# Patient Record
Sex: FEMALE | Race: WHITE | Employment: OTHER | URBAN - METROPOLITAN AREA
[De-identification: names, ages, dates, MRNs, and addresses within clinical notes are randomized per-mention and may not be internally consistent; named-entity substitution may affect disease eponyms.]

---

## 2017-04-03 ENCOUNTER — OFFICE VISIT (OUTPATIENT)
Dept: DERMATOLOGY CLINIC | Facility: CLINIC | Age: 72
End: 2017-04-03

## 2017-04-03 DIAGNOSIS — D23.9 BENIGN NEOPLASM OF SKIN, UNSPECIFIED LOCATION: ICD-10-CM

## 2017-04-03 DIAGNOSIS — L30.9 DERMATITIS: Primary | ICD-10-CM

## 2017-04-03 DIAGNOSIS — L30.4 INTERTRIGO: ICD-10-CM

## 2017-04-03 DIAGNOSIS — L57.0 AK (ACTINIC KERATOSIS): ICD-10-CM

## 2017-04-03 PROCEDURE — 99213 OFFICE O/P EST LOW 20 MIN: CPT | Performed by: DERMATOLOGY

## 2017-04-03 PROCEDURE — G0463 HOSPITAL OUTPT CLINIC VISIT: HCPCS | Performed by: DERMATOLOGY

## 2017-04-03 RX ORDER — CLOTRIMAZOLE AND BETAMETHASONE DIPROPIONATE 10; .64 MG/G; MG/G
CREAM TOPICAL
Qty: 90 G | Refills: 3 | Status: SHIPPED | OUTPATIENT
Start: 2017-04-03 | End: 2018-01-09

## 2017-04-17 NOTE — PROGRESS NOTES
Torie Frausto is a 80year old female. HPI:     CC:  Patient presents with:  Rash: established pt. presents with a rash to inframmamary region on/off for a \"very long time\". red and \"sore\". tried elocon with no results.  cornstarch worked best.   Angel Cavazos Nausea and vomiting  Demerol Hcl [Meperi*        Past Medical History   Diagnosis Date   • Unspecified essential hypertension    • High blood pressure    • Osteoarthritis          Past Surgical History     N/A     Comment x3    HYSTERECTOMY      C Well-developed well-nourished patient alert oriented in no acute distress.   Exam total-body performed, including scalp, head, neck, face,nails, hair, external eyes, including conjunctival mucosa, eyelids, lips external ears, back, chest,/ breasts, axil

## 2017-11-29 ENCOUNTER — OFFICE VISIT (OUTPATIENT)
Dept: PAIN CLINIC | Facility: HOSPITAL | Age: 72
End: 2017-11-29
Attending: ANESTHESIOLOGY
Payer: MEDICARE

## 2017-11-29 VITALS
WEIGHT: 160 LBS | DIASTOLIC BLOOD PRESSURE: 88 MMHG | BODY MASS INDEX: 28.35 KG/M2 | SYSTOLIC BLOOD PRESSURE: 177 MMHG | HEIGHT: 63 IN | RESPIRATION RATE: 18 BRPM | HEART RATE: 66 BPM

## 2017-11-29 DIAGNOSIS — M17.11 PRIMARY OSTEOARTHRITIS OF RIGHT KNEE: Primary | ICD-10-CM

## 2017-11-29 DIAGNOSIS — M17.12 PRIMARY OSTEOARTHRITIS OF LEFT KNEE: ICD-10-CM

## 2017-11-29 DIAGNOSIS — M25.569 KNEE PAIN, UNSPECIFIED CHRONICITY, UNSPECIFIED LATERALITY: ICD-10-CM

## 2017-11-29 PROCEDURE — 99201 HC OUTPT EVAL AND MGNT NEW PT LEVEL 1: CPT

## 2017-11-29 NOTE — CHRONIC PAIN
Initial Consultation    HISTORY OF PRESENT ILLNESS:  Josafat Singh is a 71year old old female presents to the pain clinic for pre-operative pain evaluation for her upcoming left knee replacement in January.    Pt underwent a previous right knee replacem Primary osteoarthritis of right knee     Hypertension     Arthritis of right knee    Past Medical History:   Diagnosis Date   • High blood pressure    • Osteoarthritis    • Unspecified essential hypertension        SURGICAL HISTORY:  Past Surgical History medial compartment with associated                subchondral sclerosis and mild osteophytic spurring. No                significant arthritis in the patellofemoral joint. No                evidence of fractures or dislocations. SOFT TISSUES: Negative.   Debara Bosworth

## 2017-12-27 RX ORDER — DICLOFENAC SODIUM 75 MG/1
75 TABLET, DELAYED RELEASE ORAL 2 TIMES DAILY PRN
COMMUNITY
End: 2018-01-09

## 2017-12-28 ENCOUNTER — LAB ENCOUNTER (OUTPATIENT)
Dept: LAB | Facility: HOSPITAL | Age: 72
End: 2017-12-28
Attending: FAMILY MEDICINE
Payer: MEDICARE

## 2017-12-28 DIAGNOSIS — Z01.818 PREOP TESTING: ICD-10-CM

## 2017-12-28 DIAGNOSIS — Z01.818 PRE-OP EXAM: ICD-10-CM

## 2017-12-28 DIAGNOSIS — I10 HTN (HYPERTENSION): Primary | ICD-10-CM

## 2017-12-28 PROCEDURE — 86850 RBC ANTIBODY SCREEN: CPT

## 2017-12-28 PROCEDURE — 36415 COLL VENOUS BLD VENIPUNCTURE: CPT

## 2017-12-28 PROCEDURE — 85025 COMPLETE CBC W/AUTO DIFF WBC: CPT

## 2017-12-28 PROCEDURE — 87641 MR-STAPH DNA AMP PROBE: CPT

## 2017-12-28 PROCEDURE — 80053 COMPREHEN METABOLIC PANEL: CPT

## 2017-12-28 PROCEDURE — 93010 ELECTROCARDIOGRAM REPORT: CPT | Performed by: FAMILY MEDICINE

## 2017-12-28 PROCEDURE — 86900 BLOOD TYPING SEROLOGIC ABO: CPT

## 2017-12-28 PROCEDURE — 86901 BLOOD TYPING SEROLOGIC RH(D): CPT

## 2017-12-28 PROCEDURE — 93005 ELECTROCARDIOGRAM TRACING: CPT

## 2018-01-05 ENCOUNTER — SURGERY (OUTPATIENT)
Age: 73
End: 2018-01-05

## 2018-01-05 ENCOUNTER — ANESTHESIA (OUTPATIENT)
Dept: SURGERY | Facility: HOSPITAL | Age: 73
DRG: 470 | End: 2018-01-05
Payer: MEDICARE

## 2018-01-05 ENCOUNTER — HOSPITAL ENCOUNTER (INPATIENT)
Facility: HOSPITAL | Age: 73
LOS: 4 days | Discharge: HOME HEALTH | DRG: 470 | End: 2018-01-09
Attending: ORTHOPAEDIC SURGERY | Admitting: ORTHOPAEDIC SURGERY
Payer: MEDICARE

## 2018-01-05 ENCOUNTER — APPOINTMENT (OUTPATIENT)
Dept: GENERAL RADIOLOGY | Facility: HOSPITAL | Age: 73
DRG: 470 | End: 2018-01-05
Attending: ORTHOPAEDIC SURGERY
Payer: MEDICARE

## 2018-01-05 ENCOUNTER — ANESTHESIA EVENT (OUTPATIENT)
Dept: SURGERY | Facility: HOSPITAL | Age: 73
DRG: 470 | End: 2018-01-05
Payer: MEDICARE

## 2018-01-05 DIAGNOSIS — Z01.818 PREOP TESTING: Primary | ICD-10-CM

## 2018-01-05 DIAGNOSIS — G89.18 ACUTE POST-OPERATIVE PAIN: ICD-10-CM

## 2018-01-05 DIAGNOSIS — Z96.652 S/P TOTAL KNEE ARTHROPLASTY, LEFT: ICD-10-CM

## 2018-01-05 DIAGNOSIS — M17.12 PRIMARY OSTEOARTHRITIS OF LEFT KNEE: ICD-10-CM

## 2018-01-05 PROCEDURE — 99232 SBSQ HOSP IP/OBS MODERATE 35: CPT | Performed by: HOSPITALIST

## 2018-01-05 PROCEDURE — 0SRD0J9 REPLACEMENT OF LEFT KNEE JOINT WITH SYNTHETIC SUBSTITUTE, CEMENTED, OPEN APPROACH: ICD-10-PCS | Performed by: ORTHOPAEDIC SURGERY

## 2018-01-05 PROCEDURE — 73560 X-RAY EXAM OF KNEE 1 OR 2: CPT | Performed by: ORTHOPAEDIC SURGERY

## 2018-01-05 PROCEDURE — 3E0T3BZ INTRODUCTION OF ANESTHETIC AGENT INTO PERIPHERAL NERVES AND PLEXI, PERCUTANEOUS APPROACH: ICD-10-PCS | Performed by: ANESTHESIOLOGY

## 2018-01-05 DEVICE — COMPONENT PTLR 28MM 1 PG WRE: Type: IMPLANTABLE DEVICE | Site: KNEE | Status: FUNCTIONAL

## 2018-01-05 DEVICE — CEMENT BONE ZIM PALICOS R: Type: IMPLANTABLE DEVICE | Site: KNEE | Status: FUNCTIONAL

## 2018-01-05 DEVICE — IMPLANTABLE DEVICE: Type: IMPLANTABLE DEVICE | Site: KNEE | Status: FUNCTIONAL

## 2018-01-05 RX ORDER — METOPROLOL TARTRATE 5 MG/5ML
2.5 INJECTION INTRAVENOUS ONCE
Status: DISCONTINUED | OUTPATIENT
Start: 2018-01-05 | End: 2018-01-05 | Stop reason: HOSPADM

## 2018-01-05 RX ORDER — GLYCOPYRROLATE 0.2 MG/ML
INJECTION, SOLUTION INTRAMUSCULAR; INTRAVENOUS AS NEEDED
Status: DISCONTINUED | OUTPATIENT
Start: 2018-01-05 | End: 2018-01-05 | Stop reason: SURG

## 2018-01-05 RX ORDER — SODIUM CHLORIDE 9 MG/ML
INJECTION, SOLUTION INTRAVENOUS
Status: COMPLETED
Start: 2018-01-05 | End: 2018-01-05

## 2018-01-05 RX ORDER — CEFAZOLIN SODIUM/WATER 2 G/20 ML
SYRINGE (ML) INTRAVENOUS AS NEEDED
Status: DISCONTINUED | OUTPATIENT
Start: 2018-01-05 | End: 2018-01-05 | Stop reason: SURG

## 2018-01-05 RX ORDER — HYDROMORPHONE HYDROCHLORIDE 1 MG/ML
0.6 INJECTION, SOLUTION INTRAMUSCULAR; INTRAVENOUS; SUBCUTANEOUS EVERY 5 MIN PRN
Status: DISCONTINUED | OUTPATIENT
Start: 2018-01-05 | End: 2018-01-05 | Stop reason: HOSPADM

## 2018-01-05 RX ORDER — FAMOTIDINE 20 MG/1
20 TABLET ORAL ONCE
Status: DISCONTINUED | OUTPATIENT
Start: 2018-01-05 | End: 2018-01-05 | Stop reason: HOSPADM

## 2018-01-05 RX ORDER — DOXEPIN HYDROCHLORIDE 50 MG/1
1 CAPSULE ORAL DAILY
Status: DISCONTINUED | OUTPATIENT
Start: 2018-01-05 | End: 2018-01-09

## 2018-01-05 RX ORDER — ONDANSETRON 2 MG/ML
INJECTION INTRAMUSCULAR; INTRAVENOUS AS NEEDED
Status: DISCONTINUED | OUTPATIENT
Start: 2018-01-05 | End: 2018-01-05 | Stop reason: SURG

## 2018-01-05 RX ORDER — LIDOCAINE HYDROCHLORIDE 10 MG/ML
INJECTION, SOLUTION EPIDURAL; INFILTRATION; INTRACAUDAL; PERINEURAL AS NEEDED
Status: DISCONTINUED | OUTPATIENT
Start: 2018-01-05 | End: 2018-01-05 | Stop reason: SURG

## 2018-01-05 RX ORDER — LOSARTAN POTASSIUM 100 MG/1
100 TABLET ORAL NIGHTLY
Status: DISCONTINUED | OUTPATIENT
Start: 2018-01-05 | End: 2018-01-09

## 2018-01-05 RX ORDER — DEXAMETHASONE SODIUM PHOSPHATE 4 MG/ML
VIAL (ML) INJECTION AS NEEDED
Status: DISCONTINUED | OUTPATIENT
Start: 2018-01-05 | End: 2018-01-05 | Stop reason: SURG

## 2018-01-05 RX ORDER — MIDAZOLAM HYDROCHLORIDE 1 MG/ML
INJECTION INTRAMUSCULAR; INTRAVENOUS AS NEEDED
Status: DISCONTINUED | OUTPATIENT
Start: 2018-01-05 | End: 2018-01-05 | Stop reason: SURG

## 2018-01-05 RX ORDER — HYDROCODONE BITARTRATE AND ACETAMINOPHEN 5; 325 MG/1; MG/1
2 TABLET ORAL AS NEEDED
Status: DISCONTINUED | OUTPATIENT
Start: 2018-01-05 | End: 2018-01-05 | Stop reason: HOSPADM

## 2018-01-05 RX ORDER — CEFAZOLIN SODIUM/WATER 2 G/20 ML
2 SYRINGE (ML) INTRAVENOUS ONCE
Status: DISCONTINUED | OUTPATIENT
Start: 2018-01-05 | End: 2018-01-05 | Stop reason: HOSPADM

## 2018-01-05 RX ORDER — SODIUM CHLORIDE, SODIUM LACTATE, POTASSIUM CHLORIDE, CALCIUM CHLORIDE 600; 310; 30; 20 MG/100ML; MG/100ML; MG/100ML; MG/100ML
INJECTION, SOLUTION INTRAVENOUS CONTINUOUS
Status: DISCONTINUED | OUTPATIENT
Start: 2018-01-05 | End: 2018-01-09

## 2018-01-05 RX ORDER — HYDROCHLOROTHIAZIDE 25 MG/1
12.5 TABLET ORAL DAILY
Status: DISCONTINUED | OUTPATIENT
Start: 2018-01-05 | End: 2018-01-09

## 2018-01-05 RX ORDER — SODIUM CHLORIDE 0.9 % (FLUSH) 0.9 %
10 SYRINGE (ML) INJECTION AS NEEDED
Status: DISCONTINUED | OUTPATIENT
Start: 2018-01-05 | End: 2018-01-09

## 2018-01-05 RX ORDER — ONDANSETRON 2 MG/ML
4 INJECTION INTRAMUSCULAR; INTRAVENOUS ONCE AS NEEDED
Status: COMPLETED | OUTPATIENT
Start: 2018-01-05 | End: 2018-01-05

## 2018-01-05 RX ORDER — HYDROMORPHONE HYDROCHLORIDE 1 MG/ML
0.2 INJECTION, SOLUTION INTRAMUSCULAR; INTRAVENOUS; SUBCUTANEOUS EVERY 5 MIN PRN
Status: DISCONTINUED | OUTPATIENT
Start: 2018-01-05 | End: 2018-01-05 | Stop reason: HOSPADM

## 2018-01-05 RX ORDER — HYDROCODONE BITARTRATE AND ACETAMINOPHEN 5; 325 MG/1; MG/1
1 TABLET ORAL AS NEEDED
Status: DISCONTINUED | OUTPATIENT
Start: 2018-01-05 | End: 2018-01-05 | Stop reason: HOSPADM

## 2018-01-05 RX ORDER — SODIUM CHLORIDE, SODIUM LACTATE, POTASSIUM CHLORIDE, CALCIUM CHLORIDE 600; 310; 30; 20 MG/100ML; MG/100ML; MG/100ML; MG/100ML
INJECTION, SOLUTION INTRAVENOUS CONTINUOUS
Status: DISCONTINUED | OUTPATIENT
Start: 2018-01-05 | End: 2018-01-05 | Stop reason: HOSPADM

## 2018-01-05 RX ORDER — HALOPERIDOL 5 MG/ML
0.25 INJECTION INTRAMUSCULAR ONCE AS NEEDED
Status: DISCONTINUED | OUTPATIENT
Start: 2018-01-05 | End: 2018-01-05 | Stop reason: HOSPADM

## 2018-01-05 RX ORDER — NALOXONE HYDROCHLORIDE 0.4 MG/ML
80 INJECTION, SOLUTION INTRAMUSCULAR; INTRAVENOUS; SUBCUTANEOUS AS NEEDED
Status: DISCONTINUED | OUTPATIENT
Start: 2018-01-05 | End: 2018-01-05 | Stop reason: HOSPADM

## 2018-01-05 RX ORDER — METOCLOPRAMIDE 10 MG/1
10 TABLET ORAL ONCE
Status: DISCONTINUED | OUTPATIENT
Start: 2018-01-05 | End: 2018-01-05 | Stop reason: HOSPADM

## 2018-01-05 RX ORDER — MORPHINE SULFATE 4 MG/ML
4 INJECTION, SOLUTION INTRAMUSCULAR; INTRAVENOUS EVERY 10 MIN PRN
Status: DISCONTINUED | OUTPATIENT
Start: 2018-01-05 | End: 2018-01-05 | Stop reason: HOSPADM

## 2018-01-05 RX ORDER — WARFARIN SODIUM 5 MG/1
5 TABLET ORAL ONCE
Status: COMPLETED | OUTPATIENT
Start: 2018-01-05 | End: 2018-01-05

## 2018-01-05 RX ORDER — HYDROMORPHONE HYDROCHLORIDE 1 MG/ML
INJECTION, SOLUTION INTRAMUSCULAR; INTRAVENOUS; SUBCUTANEOUS AS NEEDED
Status: DISCONTINUED | OUTPATIENT
Start: 2018-01-05 | End: 2018-01-05 | Stop reason: SURG

## 2018-01-05 RX ORDER — DOCUSATE SODIUM 100 MG/1
100 CAPSULE, LIQUID FILLED ORAL 2 TIMES DAILY
Status: DISCONTINUED | OUTPATIENT
Start: 2018-01-05 | End: 2018-01-09

## 2018-01-05 RX ORDER — METOPROLOL TARTRATE 5 MG/5ML
2 INJECTION INTRAVENOUS ONCE
Status: COMPLETED | OUTPATIENT
Start: 2018-01-05 | End: 2018-01-05

## 2018-01-05 RX ORDER — ENOXAPARIN SODIUM 100 MG/ML
30 INJECTION SUBCUTANEOUS 2 TIMES DAILY
Status: DISCONTINUED | OUTPATIENT
Start: 2018-01-06 | End: 2018-01-09

## 2018-01-05 RX ORDER — CEFAZOLIN SODIUM/WATER 2 G/20 ML
2 SYRINGE (ML) INTRAVENOUS EVERY 8 HOURS
Status: COMPLETED | OUTPATIENT
Start: 2018-01-05 | End: 2018-01-06

## 2018-01-05 RX ORDER — HYDROMORPHONE HYDROCHLORIDE 1 MG/ML
0.4 INJECTION, SOLUTION INTRAMUSCULAR; INTRAVENOUS; SUBCUTANEOUS EVERY 5 MIN PRN
Status: DISCONTINUED | OUTPATIENT
Start: 2018-01-05 | End: 2018-01-05 | Stop reason: HOSPADM

## 2018-01-05 RX ORDER — POLYETHYLENE GLYCOL 3350 17 G/17G
17 POWDER, FOR SOLUTION ORAL DAILY PRN
Status: DISCONTINUED | OUTPATIENT
Start: 2018-01-05 | End: 2018-01-09

## 2018-01-05 RX ORDER — MORPHINE SULFATE 10 MG/ML
6 INJECTION, SOLUTION INTRAMUSCULAR; INTRAVENOUS EVERY 10 MIN PRN
Status: DISCONTINUED | OUTPATIENT
Start: 2018-01-05 | End: 2018-01-05 | Stop reason: HOSPADM

## 2018-01-05 RX ORDER — MORPHINE SULFATE 2 MG/ML
2 INJECTION, SOLUTION INTRAMUSCULAR; INTRAVENOUS EVERY 10 MIN PRN
Status: DISCONTINUED | OUTPATIENT
Start: 2018-01-05 | End: 2018-01-05 | Stop reason: HOSPADM

## 2018-01-05 RX ORDER — SCOLOPAMINE TRANSDERMAL SYSTEM 1 MG/1
PATCH, EXTENDED RELEASE TRANSDERMAL AS NEEDED
Status: DISCONTINUED | OUTPATIENT
Start: 2018-01-05 | End: 2018-01-05 | Stop reason: SURG

## 2018-01-05 RX ORDER — SODIUM PHOSPHATE, DIBASIC AND SODIUM PHOSPHATE, MONOBASIC 7; 19 G/133ML; G/133ML
1 ENEMA RECTAL ONCE AS NEEDED
Status: DISCONTINUED | OUTPATIENT
Start: 2018-01-05 | End: 2018-01-09

## 2018-01-05 RX ORDER — CARVEDILOL 12.5 MG/1
12.5 TABLET ORAL 2 TIMES DAILY WITH MEALS
Status: DISCONTINUED | OUTPATIENT
Start: 2018-01-05 | End: 2018-01-09

## 2018-01-05 RX ORDER — BISACODYL 10 MG
10 SUPPOSITORY, RECTAL RECTAL
Status: DISCONTINUED | OUTPATIENT
Start: 2018-01-05 | End: 2018-01-09

## 2018-01-05 RX ADMIN — HYDROMORPHONE HYDROCHLORIDE 0.25 MG: 1 INJECTION, SOLUTION INTRAMUSCULAR; INTRAVENOUS; SUBCUTANEOUS at 09:39:00

## 2018-01-05 RX ADMIN — MIDAZOLAM HYDROCHLORIDE 2 MG: 1 INJECTION INTRAMUSCULAR; INTRAVENOUS at 09:13:00

## 2018-01-05 RX ADMIN — CEFAZOLIN SODIUM/WATER 2 G: 2 G/20 ML SYRINGE (ML) INTRAVENOUS at 09:20:00

## 2018-01-05 RX ADMIN — SODIUM CHLORIDE, SODIUM LACTATE, POTASSIUM CHLORIDE, CALCIUM CHLORIDE: 600; 310; 30; 20 INJECTION, SOLUTION INTRAVENOUS at 11:05:00

## 2018-01-05 RX ADMIN — GLYCOPYRROLATE 0.2 MG: 0.2 INJECTION, SOLUTION INTRAMUSCULAR; INTRAVENOUS at 10:52:00

## 2018-01-05 RX ADMIN — HYDROMORPHONE HYDROCHLORIDE 0.25 MG: 1 INJECTION, SOLUTION INTRAMUSCULAR; INTRAVENOUS; SUBCUTANEOUS at 09:40:00

## 2018-01-05 RX ADMIN — LIDOCAINE HYDROCHLORIDE 50 MG: 10 INJECTION, SOLUTION EPIDURAL; INFILTRATION; INTRACAUDAL; PERINEURAL at 09:13:00

## 2018-01-05 RX ADMIN — HYDROMORPHONE HYDROCHLORIDE 0.25 MG: 1 INJECTION, SOLUTION INTRAMUSCULAR; INTRAVENOUS; SUBCUTANEOUS at 09:44:00

## 2018-01-05 RX ADMIN — SODIUM CHLORIDE, SODIUM LACTATE, POTASSIUM CHLORIDE, CALCIUM CHLORIDE: 600; 310; 30; 20 INJECTION, SOLUTION INTRAVENOUS at 09:35:00

## 2018-01-05 RX ADMIN — SODIUM CHLORIDE, SODIUM LACTATE, POTASSIUM CHLORIDE, CALCIUM CHLORIDE: 600; 310; 30; 20 INJECTION, SOLUTION INTRAVENOUS at 10:25:00

## 2018-01-05 RX ADMIN — DEXAMETHASONE SODIUM PHOSPHATE 4 MG: 4 MG/ML VIAL (ML) INJECTION at 09:13:00

## 2018-01-05 RX ADMIN — HYDROMORPHONE HYDROCHLORIDE 0.25 MG: 1 INJECTION, SOLUTION INTRAMUSCULAR; INTRAVENOUS; SUBCUTANEOUS at 09:45:00

## 2018-01-05 RX ADMIN — SODIUM CHLORIDE, SODIUM LACTATE, POTASSIUM CHLORIDE, CALCIUM CHLORIDE: 600; 310; 30; 20 INJECTION, SOLUTION INTRAVENOUS at 09:13:00

## 2018-01-05 RX ADMIN — ONDANSETRON 4 MG: 2 INJECTION INTRAMUSCULAR; INTRAVENOUS at 11:00:00

## 2018-01-05 RX ADMIN — SCOLOPAMINE TRANSDERMAL SYSTEM 1 PATCH: 1 PATCH, EXTENDED RELEASE TRANSDERMAL at 08:45:00

## 2018-01-05 RX ADMIN — MIDAZOLAM HYDROCHLORIDE 1 MG: 1 INJECTION INTRAMUSCULAR; INTRAVENOUS at 08:42:00

## 2018-01-05 RX ADMIN — SODIUM CHLORIDE, SODIUM LACTATE, POTASSIUM CHLORIDE, CALCIUM CHLORIDE: 600; 310; 30; 20 INJECTION, SOLUTION INTRAVENOUS at 10:50:00

## 2018-01-05 NOTE — PROGRESS NOTES
[facility]    Progress Note    Rosio Bell Patient Status:  Inpatient    1945 MRN [id number]   Location [facility] 4W/SW/SE Attending Tootie Archibald MD   Hosp Day # 0 PCP KELIN GIPSON     Subjective:     Con Results:     Lab Results  Component Value Date   WBC 6.2 12/28/2017   HGB 13.7 12/28/2017   HCT 41.3 12/28/2017    12/28/2017   CREATSERUM 0.77 12/28/2017   BUN 11 12/28/2017    12/28/2017   K 3.4 12/28/2017    12/28/2017   CO2 2

## 2018-01-05 NOTE — INTERVAL H&P NOTE
Pre-op Diagnosis: Primary osteoarthritis left knee     The above referenced H&P was reviewed by Norberto Russo MD on 1/5/2018, the patient was examined and no significant changes have occurred in the patient's condition since the H&P was performed.   I discu

## 2018-01-05 NOTE — ANESTHESIA POSTPROCEDURE EVALUATION
Patient:  Marcial Gu    Procedure Summary     Date:  01/05/18 Room / Location:  [facility] OR [address] [place] [address] OR    Anesthesia Start:  0848 Anesthesia Stop:      Procedure:  KNEE TOTAL REPLACEMENT (Left ) Diagnosis:  (Primary osteoarthritis left knee )

## 2018-01-05 NOTE — CM/SW NOTE
ROSCOE met with the pt. At bedside. The pt. Lives with her  in a 2 story home with the bedrooms on the 2nd level. The pt. Reports being independent prior to admission with adls, ambulation and driving. The pt's children live in the area. The pt.  Is

## 2018-01-05 NOTE — ANESTHESIA PREPROCEDURE EVALUATION
Anesthesia PreOp Note    HPI:     Janet Tena is a 71year old female who presents for preoperative consultation requested by: Fouzia Verduzco MD    Date of Surgery: 1/5/2018    Procedure(s):  KNEE TOTAL REPLACEMENT  Indication: Primary osteoarthritis (LOPRESSOR) tab 25 mg 25 mg Oral Once PRN Dirk Resides, MD    famoTIDine (PEPCID) tab 20 mg 20 mg Oral Once Dirk Resides, MD    Metoclopramide HCl (REGLAN) tab 10 mg 10 mg Oral Once Dirk Resides, MD    ceFAZolin sodium (ANCEF/KEFZOL) 2 GM/20 is 79.7 kg (175 lb 9.6 oz). Her oral temperature is 97.8 °F (36.6 °C). Her blood pressure is 187/71 (abnormal) and her pulse is 62. Her respiration is 18 and oxygen saturation is 96%.     12/27/17  1222 01/05/18  0735 01/05/18  0808   BP:  (!) 168/86 (!) 18

## 2018-01-05 NOTE — BRIEF OP NOTE
[address] UNIT  Brief Op Note     Jaime Becerril Location: OR   CSN [id number] MRN [id number]   Admission Date 1/5/2018 Operation Date 1/5/2018   Attending Physician Amber Carter MD Operating Physician Sterling Cochran MD

## 2018-01-05 NOTE — ANESTHESIA PROCEDURE NOTES
Peripheral Block    Anesthesiologist:  Caity BO  Performed by:   Anesthesiologist  Patient Location:  PACU  Start Time:  1/5/2018 8:41 AM  End Time:  1/5/2018 8:45 AM  Site Identification: static ultrasound guided    Reason for Block: at surgeon's

## 2018-01-06 LAB
BASOPHILS # BLD: 0 K/UL (ref 0–0.2)
BASOPHILS NFR BLD: 0 %
EOSINOPHIL # BLD: 0 K/UL (ref 0–0.7)
EOSINOPHIL NFR BLD: 0 %
ERYTHROCYTE [DISTWIDTH] IN BLOOD BY AUTOMATED COUNT: 14 % (ref 11–15)
HCT VFR BLD AUTO: 38.4 % (ref 35–48)
HGB BLD-MCNC: 12.5 G/DL (ref 12–16)
INR BLD: 1.1 (ref 0.9–1.2)
LYMPHOCYTES # BLD: 1.7 K/UL (ref 1–4)
LYMPHOCYTES NFR BLD: 15 %
MCH RBC QN AUTO: 31 PG (ref 27–32)
MCHC RBC AUTO-ENTMCNC: 32.4 G/DL (ref 32–37)
MCV RBC AUTO: 95.5 FL (ref 80–100)
MONOCYTES # BLD: 0.9 K/UL (ref 0–1)
MONOCYTES NFR BLD: 8 %
NEUTROPHILS # BLD AUTO: 8.7 K/UL (ref 1.8–7.7)
NEUTROPHILS NFR BLD: 77 %
PLATELET # BLD AUTO: 184 K/UL (ref 140–400)
PMV BLD AUTO: 9.6 FL (ref 7.4–10.3)
PROTHROMBIN TIME: 13.9 SECONDS (ref 11.8–14.5)
RBC # BLD AUTO: 4.02 M/UL (ref 3.7–5.4)
WBC # BLD AUTO: 11.2 K/UL (ref 4–11)

## 2018-01-06 PROCEDURE — 99232 SBSQ HOSP IP/OBS MODERATE 35: CPT | Performed by: HOSPITALIST

## 2018-01-06 RX ORDER — ACETAMINOPHEN 325 MG/1
650 TABLET ORAL EVERY 6 HOURS PRN
Status: DISCONTINUED | OUTPATIENT
Start: 2018-01-06 | End: 2018-01-09

## 2018-01-06 RX ORDER — HYDROMORPHONE HYDROCHLORIDE 1 MG/ML
0.2 INJECTION, SOLUTION INTRAMUSCULAR; INTRAVENOUS; SUBCUTANEOUS EVERY 4 HOURS PRN
Status: DISCONTINUED | OUTPATIENT
Start: 2018-01-06 | End: 2018-01-09

## 2018-01-06 RX ORDER — OXYCODONE AND ACETAMINOPHEN 10; 325 MG/1; MG/1
1 TABLET ORAL EVERY 4 HOURS PRN
Status: DISCONTINUED | OUTPATIENT
Start: 2018-01-06 | End: 2018-01-09

## 2018-01-06 RX ORDER — ONDANSETRON 2 MG/ML
4 INJECTION INTRAMUSCULAR; INTRAVENOUS EVERY 4 HOURS PRN
Status: DISCONTINUED | OUTPATIENT
Start: 2018-01-06 | End: 2018-01-09

## 2018-01-06 RX ORDER — WARFARIN SODIUM 5 MG/1
5 TABLET ORAL NIGHTLY
Status: DISCONTINUED | OUTPATIENT
Start: 2018-01-06 | End: 2018-01-09

## 2018-01-06 RX ORDER — TRAMADOL HYDROCHLORIDE 50 MG/1
50 TABLET ORAL EVERY 6 HOURS PRN
Status: DISCONTINUED | OUTPATIENT
Start: 2018-01-06 | End: 2018-01-09

## 2018-01-06 RX ORDER — GABAPENTIN 300 MG/1
300 CAPSULE ORAL 3 TIMES DAILY
Status: DISCONTINUED | OUTPATIENT
Start: 2018-01-06 | End: 2018-01-08

## 2018-01-06 RX ORDER — ACETAMINOPHEN 650 MG/1
650 SUPPOSITORY RECTAL EVERY 6 HOURS PRN
Status: DISCONTINUED | OUTPATIENT
Start: 2018-01-06 | End: 2018-01-06

## 2018-01-06 NOTE — OCCUPATIONAL THERAPY NOTE
OCCUPATIONAL THERAPY EVALUATION - INPATIENT      Room Number: 407/407-A  Evaluation Date: 1/6/2018  Type of Evaluation: Initial  Presenting Problem: L TKA    Physician Order: IP Consult to Occupational Therapy  Reason for Therapy: ADL/IADL Dysfunction and History  Past Surgical History:  No date:  N/A      Comment: x3  No date: CHOLECYSTECTOMY  No date: HYSTERECTOMY  No date: TONSILLECTOMY  10/19/16: TOTAL KNEE REPLACEMENT      Comment: rt total knee- dr. Sidney Soulier  2018: TOTAL KNEE REPLACEMENT CK    FUNCTIONAL TRANSFER ASSESSMENT  Supine to Sit : Maximum assistance  Sit to Stand: Not tested  Toilet Transfer: NT  Shower Transfer: NT  Chair Transfer: NT    Bedroom Mobility: NT    BALANCE ASSESSMENT  Static Sitting: Unable to come to full sitting a

## 2018-01-06 NOTE — PROGRESS NOTES
[facility]  Hospitalist Progress  Note     Basia Fast Patient Status:  Inpatient    1945  71year old [facility] [id number]   Location 407/407-A Attending Laurita Stafford MD   Hosp Day # 1 PCP KELIN Maradiaga     ASSESSMENT/PLAN Oral BID with meals   • hydrochlorothiazide  12.5 mg Oral Daily   • omeprazole  20 mg Oral Daily   • losartan  100 mg Oral Nightly   • enoxaparin  30 mg Subcutaneous BID   • docusate sodium  100 mg Oral BID     ondansetron HCl, TraMADol HCl, oxyCODONE-acet

## 2018-01-06 NOTE — CHRONIC PAIN
[facility]  Report of Consultation    Abbey Suarez Patient Status:  Inpatient    1945 MRN [id number]   Location [facility] 4W/SW/SE Attending Rudy Beaulieu MD   Hosp Day # 1 PCP KELIN Schrader     Date of Admissio  MG per tab 1 tablet, 1 tablet, Oral, Q4H PRN  •  acetaminophen (TYLENOL) tab 650 mg, 650 mg, Oral, Q6H PRN  •  HYDROmorphone HCl PF (DILAUDID) 1 MG/ML injection 0.2 mg, 0.2 mg, Intravenous, Q4H PRN  •  lactated ringers infusion, , Intravenous, Patyt Fritz (1.575 m), weight 175 lb 9.6 oz (79.7 kg), SpO2 93 %.    Patient sleepy from curnt PCA still in bed   NAD on O2 nc   A and O x 3  CV:   RRR,   Pulm:   CTA bilat  Abd:   +bs, soft, NT,       Laboratory Data:    Lab Results  Component Value Date   WBC 11.2 01

## 2018-01-06 NOTE — PHYSICAL THERAPY NOTE
PHYSICAL THERAPY KNEE EVALUATION - INPATIENT     Room Number: 407/407-A  Evaluation Date: 1/6/2018  Type of Evaluation: Initial  Physician Order: PT Eval and Treat    Presenting Problem: L TKA  Reason for Therapy: Mobility Dysfunction and Discharge Plannin training;Neuromuscular re-educate;Range of motion;Strengthening;Stair training;Transfer training;Balance training  Rehab Potential : Good  Frequency (Obs): BID       PHYSICAL THERAPY MEDICAL/SOCIAL HISTORY     History related to current admission: elective into full sitting)  Dynamic Sitting: Not tested  Static Standing: Not tested  Dynamic Standing: Not tested                                                                       ADDITIONAL TESTS                                 ACTIVITY TOLERANCE  Fair    AM modified independent    Goal #3   Current Status 5 ft with mod x1    Goal #4 Patient will negotiate 14  stairs/one curb w/ assistive device and supervision   Goal #4   Current Status    Goal #5  AROM 0 degrees extension to 95 degrees flexion     Goal #5

## 2018-01-06 NOTE — PROGRESS NOTES
ORTHO SURG: PO#1  Tmax = 98.7. AM LABS: INR = 1.1, WBC = 11,200, H/H = 12.5 / 38.4, PLATELETS = 204,092. HEMOVAC DRAINAGE OVER LAST TWO 8 HOUR SHIFTS : 25 ML / 75 ML, DRAINAGE FROM 0700 - 1400 = 90ML. PAIN CONTROL WITH PERCOCET, DILAUDID PCA WAS D/C'D.

## 2018-01-06 NOTE — PLAN OF CARE
DISCHARGE PLANNING    • Discharge to home or other facility with appropriate resources Progressing    Discharge plan is home with [facility] when stable    GENITOURINARY - ADULT    • Absence of urinary retention Progressing    Gamez remains in place until POD# remains c/d/i. Hemovac in place . No other skin issues noted. Assisted pt with repositioning.

## 2018-01-07 LAB
BASOPHILS # BLD: 0 K/UL (ref 0–0.2)
BASOPHILS NFR BLD: 1 %
EOSINOPHIL # BLD: 0.3 K/UL (ref 0–0.7)
EOSINOPHIL NFR BLD: 3 %
ERYTHROCYTE [DISTWIDTH] IN BLOOD BY AUTOMATED COUNT: 13.8 % (ref 11–15)
HCT VFR BLD AUTO: 35.1 % (ref 35–48)
HGB BLD-MCNC: 11.6 G/DL (ref 12–16)
INR BLD: 1.4 (ref 0.9–1.2)
LYMPHOCYTES # BLD: 2.2 K/UL (ref 1–4)
LYMPHOCYTES NFR BLD: 23 %
MCH RBC QN AUTO: 31.3 PG (ref 27–32)
MCHC RBC AUTO-ENTMCNC: 32.9 G/DL (ref 32–37)
MCV RBC AUTO: 95 FL (ref 80–100)
MONOCYTES # BLD: 0.9 K/UL (ref 0–1)
MONOCYTES NFR BLD: 10 %
NEUTROPHILS # BLD AUTO: 6.1 K/UL (ref 1.8–7.7)
NEUTROPHILS NFR BLD: 64 %
PLATELET # BLD AUTO: 149 K/UL (ref 140–400)
PMV BLD AUTO: 9.1 FL (ref 7.4–10.3)
PROTHROMBIN TIME: 16.7 SECONDS (ref 11.8–14.5)
RBC # BLD AUTO: 3.7 M/UL (ref 3.7–5.4)
WBC # BLD AUTO: 9.6 K/UL (ref 4–11)

## 2018-01-07 PROCEDURE — 99232 SBSQ HOSP IP/OBS MODERATE 35: CPT | Performed by: HOSPITALIST

## 2018-01-07 NOTE — PROGRESS NOTES
ORTHO SURG: PO#2. Tmax = 99.2. AM LABS: INR = 1.4, WBC = 9,600, H/H = 11.6 / 35.1, PLATELETS = 902,270. PAIN IS CONTROLLED. PATIENT DENIES ANY NAUSEA. HEMOVAC OUTPUT OVER THE LAST TWO 8 HOUR SHIFTS: 55 ML / 30 ML.  PE: ALERT AT BEDREST, NAD, WOUND DRAIN

## 2018-01-07 NOTE — CHRONIC PAIN
[facility]  Anesthesiology Pain Management Progress Note      Patient name:  Vivi Cooper 71year old female  : 1945  MRN: [id number]    Diagnosis: Preop testing  (primary encounter diagnosis)  Acute post-operative pain    Reason

## 2018-01-07 NOTE — PHYSICAL THERAPY NOTE
PHYSICAL THERAPY KNEE TREATMENT NOTE - INPATIENT     Room Number: 407/407-A             Presenting Problem: L TKA    Problem List  Principal Problem:    Primary osteoarthritis of left knee  Active Problems:    Essential hypertension      ASSESSMENT   AM S Weight Bearing    PAIN ASSESSMENT   Ratin  Location: L knee  Management Techniques: Activity promotion; Body mechanics; Relaxation;Repositioning    BALANCE  Static Sitting: Good  Dynamic Sitting: Fair +  Static Standing: Fair -  Dynamic Standing: Poor + goal is: \"go home\"   Goal #1 Patient is able to demonstrate supine - sit EOB @ level: modified independent     Goal #1   Current Status Min   Goal #2 Patient is able to demonstrate transfers Sit to/from Stand at assistance level: modified independent

## 2018-01-08 LAB
BASOPHILS # BLD: 0 K/UL (ref 0–0.2)
BASOPHILS NFR BLD: 0 %
EOSINOPHIL # BLD: 0.3 K/UL (ref 0–0.7)
EOSINOPHIL NFR BLD: 4 %
ERYTHROCYTE [DISTWIDTH] IN BLOOD BY AUTOMATED COUNT: 13.7 % (ref 11–15)
HCT VFR BLD AUTO: 33.4 % (ref 35–48)
HGB BLD-MCNC: 11.3 G/DL (ref 12–16)
INR BLD: 1.7 (ref 0.9–1.2)
LYMPHOCYTES # BLD: 2.6 K/UL (ref 1–4)
LYMPHOCYTES NFR BLD: 32 %
MCH RBC QN AUTO: 31.6 PG (ref 27–32)
MCHC RBC AUTO-ENTMCNC: 33.8 G/DL (ref 32–37)
MCV RBC AUTO: 93.5 FL (ref 80–100)
MONOCYTES # BLD: 0.9 K/UL (ref 0–1)
MONOCYTES NFR BLD: 11 %
NEUTROPHILS # BLD AUTO: 4.4 K/UL (ref 1.8–7.7)
NEUTROPHILS NFR BLD: 53 %
PLATELET # BLD AUTO: 147 K/UL (ref 140–400)
PMV BLD AUTO: 8.7 FL (ref 7.4–10.3)
PROTHROMBIN TIME: 19.3 SECONDS (ref 11.8–14.5)
RBC # BLD AUTO: 3.58 M/UL (ref 3.7–5.4)
WBC # BLD AUTO: 8.3 K/UL (ref 4–11)

## 2018-01-08 PROCEDURE — 99232 SBSQ HOSP IP/OBS MODERATE 35: CPT | Performed by: HOSPITALIST

## 2018-01-08 RX ORDER — ZOLPIDEM TARTRATE 5 MG/1
5 TABLET ORAL NIGHTLY PRN
Qty: 10 TABLET | Refills: 0 | Status: SHIPPED | OUTPATIENT
Start: 2018-01-08

## 2018-01-08 RX ORDER — GABAPENTIN 300 MG/1
600 CAPSULE ORAL 3 TIMES DAILY
Status: DISCONTINUED | OUTPATIENT
Start: 2018-01-08 | End: 2018-01-09

## 2018-01-08 NOTE — CM/SW NOTE
CTL rounds: went to pt's room to enroll pt in [employer] program and give IM - pt is sleeping soundly at this time. CTL will follow up this afternoon. Discharge plan: anticipated that pt will be discharged to home with [facility] home health care.

## 2018-01-08 NOTE — PHYSICAL THERAPY NOTE
PHYSICAL THERAPY KNEE TREATMENT NOTE - INPATIENT     Room Number: 407/407-A             Presenting Problem: L TKA    Problem List  Principal Problem:    Primary osteoarthritis of left knee  Active Problems:    Essential hypertension      ASSESSMENT   Pt i from a chair with arms (e.g., wheelchair, bedside commode, etc.): A Little   -   Moving from lying on back to sitting on the side of the bed?: A Little   How much help from another person does the patient currently need. ..   -   Moving to and from a bed to will negotiate 14  stairs/one curb w/ assistive device and supervision   Goal #4   Current Status NT   Goal #5  AROM 0 degrees extension to 95 degrees flexion     Goal #5   Current Status IN PROGRESS   Goal #6 Patient independently performs home exercise p

## 2018-01-08 NOTE — PROGRESS NOTES
[facility]  Hospitalist Progress  Note     Ziyad Fuller Patient Status:  Inpatient    1945  71year old [facility] [id number]   Location 407/407-A Attending George Tinajero MD   Hosp Day # 3 PCP KELIN Rob     ASSESSMENT/PLAN 1.4*  1.7*       • gabapentin  300 mg Oral TID   • Warfarin Sodium  5 mg Oral Nightly   • multivitamin  1 tablet Oral Daily   • carvedilol  12.5 mg Oral BID with meals   • hydrochlorothiazide  12.5 mg Oral Daily   • omeprazole  20 mg Oral Daily   • ivone

## 2018-01-08 NOTE — CM/SW NOTE
CTL rounds:  Met with pt at bedside. Pt plans to go home with [facility] health care. States that she has contacted [facility] to request the RN and PT that she is has previously worked with. Pt is BPCI eligible under .   [employer] p

## 2018-01-08 NOTE — PROGRESS NOTES
ORTHO SURG: PO#3  Tmax = 99.2. AM LABS: INR = 1.7, WBC = 8,300, H/H = 11.3 / 33.4, PLATELETS = 030,293. NO POST-OP BM. PAIN MGT. PER ANESTHESIA PAIN SERVICE, CURRENTLY INCLUDES PERCOCET 10/325 q 4 HRS.   PT. D/W DR. Lucy Jefferson REGARDING POST DISCHARGE PAIN MGT.

## 2018-01-08 NOTE — PLAN OF CARE
Impaired Functional Mobility    • Achieve highest/safest level of mobility/gait Progressing    Pt tolerated CPM at 44 degrees flexion and 0 degrees extension. Ambulates with 1 assist and a walker. PT Bid as ordered.  Left knee mmobilizer dc'd this am at 9am

## 2018-01-09 VITALS
SYSTOLIC BLOOD PRESSURE: 148 MMHG | DIASTOLIC BLOOD PRESSURE: 68 MMHG | TEMPERATURE: 99 F | HEART RATE: 68 BPM | OXYGEN SATURATION: 93 % | BODY MASS INDEX: 32.32 KG/M2 | WEIGHT: 175.63 LBS | HEIGHT: 62 IN | RESPIRATION RATE: 18 BRPM

## 2018-01-09 LAB
BASOPHILS # BLD: 0 K/UL (ref 0–0.2)
BASOPHILS NFR BLD: 1 %
EOSINOPHIL # BLD: 0.4 K/UL (ref 0–0.7)
EOSINOPHIL NFR BLD: 6 %
ERYTHROCYTE [DISTWIDTH] IN BLOOD BY AUTOMATED COUNT: 13.6 % (ref 11–15)
HCT VFR BLD AUTO: 33.2 % (ref 35–48)
HGB BLD-MCNC: 11.2 G/DL (ref 12–16)
INR BLD: 1.9 (ref 0.9–1.2)
LYMPHOCYTES # BLD: 2.5 K/UL (ref 1–4)
LYMPHOCYTES NFR BLD: 36 %
MCH RBC QN AUTO: 31.7 PG (ref 27–32)
MCHC RBC AUTO-ENTMCNC: 33.7 G/DL (ref 32–37)
MCV RBC AUTO: 94 FL (ref 80–100)
MONOCYTES # BLD: 0.8 K/UL (ref 0–1)
MONOCYTES NFR BLD: 11 %
NEUTROPHILS # BLD AUTO: 3.2 K/UL (ref 1.8–7.7)
NEUTROPHILS NFR BLD: 47 %
PLATELET # BLD AUTO: 159 K/UL (ref 140–400)
PMV BLD AUTO: 8.7 FL (ref 7.4–10.3)
PROTHROMBIN TIME: 21.7 SECONDS (ref 11.8–14.5)
RBC # BLD AUTO: 3.53 M/UL (ref 3.7–5.4)
WBC # BLD AUTO: 7 K/UL (ref 4–11)

## 2018-01-09 PROCEDURE — 99239 HOSP IP/OBS DSCHRG MGMT >30: CPT | Performed by: HOSPITALIST

## 2018-01-09 RX ORDER — OXYCODONE AND ACETAMINOPHEN 10; 325 MG/1; MG/1
1 TABLET ORAL EVERY 4 HOURS PRN
Qty: 30 TABLET | Refills: 0 | Status: SHIPPED | OUTPATIENT
Start: 2018-01-09 | End: 2018-01-18

## 2018-01-09 RX ORDER — GABAPENTIN 300 MG/1
600 CAPSULE ORAL 3 TIMES DAILY
Qty: 30 CAPSULE | Refills: 0 | Status: SHIPPED | OUTPATIENT
Start: 2018-01-09 | End: 2018-01-18

## 2018-01-09 RX ORDER — WARFARIN SODIUM 5 MG/1
5 TABLET ORAL NIGHTLY
Qty: 30 TABLET | Refills: 0 | Status: SHIPPED | OUTPATIENT
Start: 2018-01-09 | End: 2018-01-09

## 2018-01-09 RX ORDER — WARFARIN SODIUM 2 MG/1
TABLET ORAL
Qty: 60 TABLET | Refills: 0 | Status: SHIPPED | OUTPATIENT
Start: 2018-01-09

## 2018-01-09 NOTE — PROGRESS NOTES
ORTHO SURG: Tmax = 98.8. AM LABS: INR = 1.9, WBC = 7,000, H/H =  11.2 / 33.2, PLATELETS = 153,980. PAIN MGT. WITH PERCOCET 10/325  AND GABAPENTIN - RX'D PER PAIN MANAGEMENT. PE: ALERT, NAD, LEFT KNEE WOUND IS CLEAN AND DRY WITHOUT ERYTHEMA OR INDURATION.

## 2018-01-09 NOTE — PHYSICAL THERAPY NOTE
PHYSICAL THERAPY KNEE TREATMENT NOTE - INPATIENT     Room Number: 407/407-A             Presenting Problem: L TKA    Problem List  Principal Problem:    Primary osteoarthritis of left knee  Active Problems:    Essential hypertension      ASSESSMENT    Pt from a bed to a chair (including a wheelchair)?: A Little   -   Need to walk in hospital room?: A Little   -   Climbing 3-5 steps with a railing?: A Little    AM-PAC Score:  Raw Score: 18   PT Approx Degree of Impairment Score: 46.58%   Standardized Score 95 degrees flexion     Goal #5   Current Status -4/80 AAROM    Goal #6 Patient independently performs home exercise program for ROM/strengthening per the instructions provided in preparation for discharge.    Goal #6  Current Status In progress

## 2018-01-09 NOTE — PLAN OF CARE
GENITOURINARY - ADULT    • Absence of urinary retention Completed          GENITOURINARY - ADULT    • Absence of urinary retention Completed          DISCHARGE PLANNING    • Discharge to home or other facility with appropriate resources Progressing

## 2018-01-09 NOTE — CM/SW NOTE
MD orders received regarding HHC and CPM for home. SW notified [facility] of discharge and sent MD orders. CPM has been ordered through [facility] . Both agencies are aware of the pt. Discharging home today 1/9.     [facility] [address] [place] [address] [phone]

## 2018-01-09 NOTE — PLAN OF CARE
DISCHARGE PLANNING    • Discharge to home or other facility with appropriate resources Completed        HEMATOLOGIC - ADULT    • Maintains hematologic stability Completed    • Free from bleeding injury Completed        Impaired Functional Mobility    • Ach

## 2018-01-09 NOTE — DISCHARGE SUMMARY
[facility]    Discharge Summary    Mishel Park Patient Status:  Inpatient    1945 MRN [id number]   Location [facility] 4W/SW/SE Attending Leroy Russo MD   Hosp Day # 4 PCP KELIN [facility]     Date of Admi primary who presents for elective arthroplasty. Hospital Course:   Left knee osteoarthritis status post total knee replacement on 1/5/2018. Overall doing well.   PCA stopped, drain removed  -Pain controlled on PO meds  -Coumadin 5 mg nightly, continue, clotrimazole-betamethasone 1-0.05 % Crea  Commonly known as:  LOTRISONE        Diclofenac Sodium 75 MG Tbec  Commonly known as:  VOLTAREN              Where to Get Your Medications      Please  your prescriptions at the location directed by your d

## 2018-01-16 ENCOUNTER — TELEPHONE (OUTPATIENT)
Dept: PAIN CLINIC | Facility: HOSPITAL | Age: 73
End: 2018-01-16

## 2018-01-18 ENCOUNTER — OFFICE VISIT (OUTPATIENT)
Dept: PAIN CLINIC | Facility: HOSPITAL | Age: 73
End: 2018-01-18
Attending: ANESTHESIOLOGY
Payer: MEDICARE

## 2018-01-18 VITALS
BODY MASS INDEX: 30.12 KG/M2 | SYSTOLIC BLOOD PRESSURE: 169 MMHG | HEIGHT: 63 IN | HEART RATE: 78 BPM | WEIGHT: 170 LBS | DIASTOLIC BLOOD PRESSURE: 84 MMHG

## 2018-01-18 DIAGNOSIS — G89.18 POSTOPERATIVE PAIN OF LEFT KNEE: Primary | ICD-10-CM

## 2018-01-18 DIAGNOSIS — G89.18 ACUTE POST-OPERATIVE PAIN: ICD-10-CM

## 2018-01-18 DIAGNOSIS — M25.562 POSTOPERATIVE PAIN OF LEFT KNEE: Primary | ICD-10-CM

## 2018-01-18 DIAGNOSIS — Z01.818 PREOP TESTING: ICD-10-CM

## 2018-01-18 PROCEDURE — 99211 OFF/OP EST MAY X REQ PHY/QHP: CPT

## 2018-01-18 RX ORDER — GABAPENTIN 300 MG/1
300 CAPSULE ORAL 3 TIMES DAILY
Qty: 90 CAPSULE | Refills: 2 | Status: SHIPPED | OUTPATIENT
Start: 2018-01-18

## 2018-01-18 RX ORDER — OXYCODONE AND ACETAMINOPHEN 10; 325 MG/1; MG/1
1 TABLET ORAL EVERY 4 HOURS PRN
Qty: 120 TABLET | Refills: 0 | Status: SHIPPED | OUTPATIENT
Start: 2018-01-18 | End: 2018-02-17

## 2018-01-18 NOTE — PROGRESS NOTES
11/29/17  PRESENTS AMBULATORY TO CPM;  NEW CONSULT-PT HERE FOR POST OP PAIN CONSULTATION;  SHE IS HAVING HER LT KNEE REPLACED  1/16/18;  UNABLE TO TOLERATE NARCOTICS AND WANTS TO DISCUSS PAIN CONTROL-OPTIONS WITH MD;  EXAMINED BY DR. BURT;  REFER TO HIS DI

## 2018-01-18 NOTE — CHRONIC PAIN
[facility]  Pain Clinic  Follow Up Visit Report       Patient name: Waynetta Brunner 71year old female  : 1945  MRN: [id number]  Referring MD: No ref. provider found     COMPLAINT:  Patient presents with:   Follow - Up: left knee po (600 mg total) by mouth 3 (three) times daily. , Disp: 30 capsule, Rfl: 0  •  Warfarin Sodium 2 MG Oral Tab, TAKE 3 TABLETS ON ODD NUMBERED DAYS TAKE 2 TABLETS ON EVEN NUMBERED DAYS, Disp: 60 tablet, Rfl: 0  •  multivitamin Oral Tab, Take 1 tablet by mouth

## 2018-01-22 ENCOUNTER — LAB ENCOUNTER (OUTPATIENT)
Dept: LAB | Facility: HOSPITAL | Age: 73
End: 2018-01-22
Attending: ORTHOPAEDIC SURGERY
Payer: MEDICARE

## 2018-01-22 DIAGNOSIS — Z51.81 THERAPEUTIC DRUG MONITORING: Primary | ICD-10-CM

## 2018-01-22 LAB
INR BLD: 2.2 (ref 0.9–1.2)
PROTHROMBIN TIME: 23.6 SECONDS (ref 11.8–14.5)

## 2018-01-22 PROCEDURE — 36415 COLL VENOUS BLD VENIPUNCTURE: CPT

## 2018-01-22 PROCEDURE — 85610 PROTHROMBIN TIME: CPT

## 2018-02-06 NOTE — OPERATIVE REPORT
[facility]    PATIENT'S NAME: Nella Manuel   ATTENDING PHYSICIAN: Pennie Wills MD   OPERATING PHYSICIAN: Meenakshi Willis.  MD Jorge   PATIENT ACCOUNT#:   [de-identified]    LOCATION:  [address] [place] [address] #:   [id number]       DATE OF BIRTH: preparation technique. The patient received intravenous Ancef 2 g as prophylactic antibiotic in this timeframe. 2    A right short-leg sequential compression device was placed.   Cast padding and pneumatic tourniquet was secured over the proximal left ligament from its tibial insertion; and then subsequent resection of that ligament proximally. Bleeding points were controlled by cautery as they were encountered.   Concurrent with the partial medial meniscectomy, the deep portion of the medial collateral placement of a femoral sizing block, which was set flush to the distal cut surface and referenced for 3 degrees of external rotation with respect to the posterior femoral condyles.   With this centered and flush to the cut surface, drill holes were made med allowed for placement of a 14 mm tibial bearing, which provided full extension and optimal tracking in flexion and stable varus-valgus balance.     The patella was addressed next by securing it with 2 towel clips and then caliper thickness measured it to a and implantation of a [employer] patellar prosthesis of 28 mm diameter. This was placed with manual compression and then secured with the bone with a compression clamp. Excess cement was removed.   Pressure to both components was maintained until sterile petroleum gauze, dry gauze bandages, all of which were secured with a bulky Lopez compression bandage. The patient was reversed from anesthesia uneventfully and transferred to bed and then to the PACU, where she arrived in stable condition.

## 2018-08-13 ENCOUNTER — OFFICE VISIT (OUTPATIENT)
Dept: DERMATOLOGY CLINIC | Facility: CLINIC | Age: 73
End: 2018-08-13
Payer: MEDICARE

## 2018-08-13 DIAGNOSIS — L57.0 AK (ACTINIC KERATOSIS): ICD-10-CM

## 2018-08-13 DIAGNOSIS — L30.9 DERMATITIS: ICD-10-CM

## 2018-08-13 DIAGNOSIS — L30.4 INTERTRIGO: Primary | ICD-10-CM

## 2018-08-13 DIAGNOSIS — L71.9 ROSACEA: ICD-10-CM

## 2018-08-13 DIAGNOSIS — D23.5 BENIGN NEOPLASM OF SKIN OF TRUNK, EXCEPT SCROTUM: ICD-10-CM

## 2018-08-13 DIAGNOSIS — L82.1 SEBORRHEIC KERATOSES: ICD-10-CM

## 2018-08-13 DIAGNOSIS — D23.30 BENIGN NEOPLASM OF SKIN OF FACE: ICD-10-CM

## 2018-08-13 DIAGNOSIS — D23.60 BENIGN NEOPLASM OF SKIN OF UPPER LIMB, INCLUDING SHOULDER, UNSPECIFIED LATERALITY: ICD-10-CM

## 2018-08-13 DIAGNOSIS — D23.4 BENIGN NEOPLASM OF SCALP AND SKIN OF NECK: ICD-10-CM

## 2018-08-13 PROCEDURE — G0463 HOSPITAL OUTPT CLINIC VISIT: HCPCS | Performed by: DERMATOLOGY

## 2018-08-13 PROCEDURE — 99213 OFFICE O/P EST LOW 20 MIN: CPT | Performed by: DERMATOLOGY

## 2018-08-13 RX ORDER — CARVEDILOL 12.5 MG/1
TABLET ORAL
COMMUNITY
Start: 2018-07-16

## 2018-08-13 RX ORDER — CLOTRIMAZOLE AND BETAMETHASONE DIPROPIONATE 10; .64 MG/G; MG/G
CREAM TOPICAL
Qty: 90 G | Refills: 2 | Status: SHIPPED | OUTPATIENT
Start: 2018-08-13

## 2018-08-13 RX ORDER — AMMONIUM LACTATE 12 G/100G
1 LOTION TOPICAL 2 TIMES DAILY
Qty: 500 G | Refills: 11 | Status: SHIPPED | OUTPATIENT
Start: 2018-08-13 | End: 2018-08-13

## 2018-08-13 RX ORDER — LOSARTAN POTASSIUM 100 MG/1
100 TABLET ORAL
COMMUNITY

## 2018-08-13 RX ORDER — AMMONIUM LACTATE 12 G/100G
1 LOTION TOPICAL 2 TIMES DAILY
Qty: 1500 G | Refills: 3 | Status: SHIPPED | OUTPATIENT
Start: 2018-08-13 | End: 2018-09-12

## 2018-08-13 RX ORDER — CLOTRIMAZOLE AND BETAMETHASONE DIPROPIONATE 10; .64 MG/G; MG/G
CREAM TOPICAL
Qty: 45 G | Refills: 2 | Status: SHIPPED | OUTPATIENT
Start: 2018-08-13 | End: 2018-08-13

## 2018-08-26 NOTE — PROGRESS NOTES
Daquan Harvey is a 68year old female. HPI:     CC:  Patient presents with:  Full Skin Exam: LOV 4/3/2017. Pt presenting for full body skin exam. Pt denies personal and family hx of skin cancer. Rosacea: Pt f/u with rosacea.          Allergies:  Cherie Wynn daily as needed. Disp:  Rfl:    hydrochlorothiazide 12.5 MG Oral Tab Take 12.5 mg by mouth daily. Disp:  Rfl:    gabapentin 300 MG Oral Cap Take 1 capsule (300 mg total) by mouth 3 (three) times daily.  Disp: 90 capsule Rfl: 2   Zolpidem Tartrate (AMBIEN) 5 exam. Pt denies personal and family hx of skin cancer. Rosacea: Pt f/u with rosacea. Patient presents with concerns above. Patient has been in their usual state of health. History, medications, allergies reviewed as noted.       ROS:  Denies any except scrotum  Benign neoplasm of skin of upper limb, including shoulder, unspecified laterality  Seborrheic keratoses  Rosacea    See details on map. Remarkable for:    Erythema of the central face. Rosacea. Meds in grid.   Skin care instructions rev

## 2019-01-23 ENCOUNTER — HOSPITAL ENCOUNTER (OUTPATIENT)
Dept: GENERAL RADIOLOGY | Age: 74
Discharge: HOME | End: 2019-01-23
Attending: ORTHOPAEDIC SURGERY
Payer: MEDICARE

## 2019-01-23 DIAGNOSIS — Z09 FOLLOW UP: ICD-10-CM

## 2019-01-23 PROCEDURE — 73560 X-RAY EXAM OF KNEE 1 OR 2: CPT | Performed by: ORTHOPAEDIC SURGERY

## 2019-06-06 NOTE — PROGRESS NOTES
11/29/17  PRESENTS AMBULATORY TO CPM;  NEW CONSULT-PT HERE FOR POST OP PAIN CONSULTATION;  SHE IS HAVING HER LT KNEE REPLACED  1/16/18;  UNABLE TO TOLERATE NARCOTICS AND WANTS TO DISCUSS PAIN CONTROL-OPTIONS WITH MD;  EXAMINED BY DR. BURT;  REFER TO HIS DI
no

## 2020-06-09 ENCOUNTER — HOSPITAL ENCOUNTER (OUTPATIENT)
Dept: CT IMAGING | Age: 75
Discharge: HOME | End: 2020-06-09
Attending: FAMILY MEDICINE

## 2020-06-09 DIAGNOSIS — Z13.9 SCREENING PROCEDURE: ICD-10-CM

## 2024-09-23 NOTE — PROGRESS NOTES
ID: Ailin Ding  : 1945  Date: 2024     Chief Complaint   Patient presents with    Urge Incontinence     UUI X 3 years       HPI:  79 year old female, G4,  x 3,  who presents for evaluation of urinary incontinence for the past 3 years.  She has frequency, urgency and UUI  Denies nocturia  No SUKHWINDER  No prolapse  Status post hysterectomy and BSO in  for abnormal bleeding  Bowels are loose. Has dx of IBS-D  Not sexually active at this time due to 's health.  Denies UTIs. No recent urines on file.    PMHx: HTN, OA, BMI 30.       Urogynecology Summary:  Urogynecology Summary  Prolapse: No  SUKHWINDER: No  Urge Incontinence: Yes  Nocturia Frequency: 0  Frequency: 1 - 2 hours (Reports can be hourly.)  Incomplete emptying: No  Constipation: No (Reports Hx. of IBSD. Follows with GI, (Dr. Mccarthy))  Wears pad day?: 3 (Reports heavy pads during the day. Reports pads are wet.)  Wears Pad Night?: 1 (Reports pad is dry in the am. However, becomes wet walking to the bathroom in the am.)  Activities are limited by UI/POP?: No  Currently Sexually Active: No  Avoids sexual activity due to: Other (.  with medical issues.)          HISTORY:  Past Medical History:    Esophageal reflux    High blood pressure    IBS (irritable bowel syndrome)    Migraines    none after menopause    Multiple thyroid nodules    Osteoarthritis      Past Surgical History:   Procedure Laterality Date    Breast biopsy      1987, 2000     N/A     3/1976, 10/1978, 1984    Cholecystectomy  1997    Hysterectomy  2017    Tonsillectomy      Total knee replacement  10/19/2016    rt total knee- dr. Knox    Total knee replacement Left 2018    DR. MCGRATH      Family History   Problem Relation Age of Onset    Heart Disorder Father     Hypertension Mother     Cancer Mother         Colon    Cancer Daughter         Brain    Cancer Sister         breast Ca    Cancer Brother         Brain      Social  History     Socioeconomic History    Marital status:    Tobacco Use    Smoking status: Never    Smokeless tobacco: Never   Substance and Sexual Activity    Alcohol use: Yes     Alcohol/week: 14.0 standard drinks of alcohol     Types: 14 Glasses of wine per week    Drug use: No   Other Topics Concern    Pt has a pacemaker No    Pt has a defibrillator No    Reaction to local anesthetic No     Social Determinants of Health      Received from [facility]        Allergies:  Allergies   Allergen Reactions    Codeine NAUSEA AND VOMITING     Other reaction(s): Unknown    Demerol Hcl [Meperidine] NAUSEA AND VOMITING     Other reaction(s): Unknown       Medications:  Outpatient Medications Prior to Visit   Medication Sig Dispense Refill    lisinopril 10 MG Oral Tab Take 1 tablet (10 mg total) by mouth daily.      carvedilol 12.5 MG Oral Tab       Zolpidem Tartrate (AMBIEN) 5 MG Oral Tab Take 1 tablet (5 mg total) by mouth nightly as needed for Sleep. 10 tablet 0    multivitamin Oral Tab Take 1 tablet by mouth daily.      omeprazole 20 MG Oral Capsule Delayed Release Take 2 capsules (40 mg total) by mouth daily as needed.      hydrochlorothiazide 12.5 MG Oral Tab Take 1 tablet (12.5 mg total) by mouth daily.      losartan 100 MG Oral Tab Take 100 mg by mouth. (Patient not taking: Reported on 9/24/2024)      clotrimazole-betamethasone 1-0.05 % External Cream Use bid to affected areas of skin (Patient not taking: Reported on 9/24/2024) 90 g 2    metRONIDAZOLE 0.75 % External Cream Use bid to affected areas of face (Patient not taking: Reported on 9/24/2024) 90 g 3    gabapentin 300 MG Oral Cap Take 1 capsule (300 mg total) by mouth 3 (three) times daily. (Patient not taking: Reported on 9/24/2024) 90 capsule 2    Warfarin Sodium 2 MG Oral Tab TAKE 3 TABLETS ON ODD NUMBERED DAYS  TAKE 2 TABLETS ON EVEN NUMBERED DAYS (Patient not taking: Reported on 9/24/2024) 60 tablet 0     No facility-administered  medications prior to visit.       Review of Systems:    A comprehensive 12 point review of systems was completed.  Pertinent positives noted in the the HPI.  Denies CP  Denies SOB    Vitals:  Resp 18   Ht 62\"   Wt 164 lb (74.4 kg)   BMI 30.00 kg/m²        GENERAL EXAM:  GENERAL:  Alert and oriented. Well-nourished, normally developed.  Thought and emotional status are appropriate, speech is understandable.  No acute distress.   HEAD: Normocephalic and atraumatic with normal hair distribution  LUNGS:  Normal respiratory effort.    ABDOMEN: Non tender to palpation, tone normal without rigidity or guarding, no masses present, no evidence of hernia. Vertical midline scar noted.   EXTREMITIES:  Without edema, varicosities or lesions.   SKIN:  Warm and dry, with good color and turgor. No lesions.    PELVIC EXAM:  External Genitalia: Normal appearance for age. + atrophy, no lesions  Urethra: + atrophy, non tender  Bladder:no fullness, non tender  Vagina: + atrophy, no lesions, narrow introitus   Cervix and uterus: absent  Adnexa: N/A  Perineum: non tender  Anus: no hemorrhoids  Rectum: deferred.     PELVIS FLOOR NEUROMUSCULAR FUNCTION:  Strength:  1  Perineal Sensation:  Normal      PELVIC SUPPORT:  [place]:  0  Ant:  0  Post:  0  CST:  negative  UVJ: not hypermobile    The patient voided 10 ml in the privacy of the bathroom. She was catheterized for PVR of < 5 ml. Specimen sent for C&S.     Impression/Plan:    ICD-10-CM    1. Urge incontinence  N39.41 Urine Culture, Routine     Straight Cath PVR      2. Frequency of micturition  R35.0           Discussion Items:   Behavioral and pharmacologic treatments for OAB  Discussed dietary and behavioral modification, discussed pharmacologic and nonpharmacologic mgmt options for urinary symptoms. Discussed dietary & weight management with potential improvements in symptoms with weight loss.    Diagnostic Items:  Urine C&S    Medications Discussed:  Agrees to trial of Gemtesa 75 mg  PO daily    Treatment Plan, Non-surgical:   None    Treatment Plan, Surgical:   None    Pt verbalizes understanding of all above discussed information. Follow up in 6 weeks with PA (phone visit OK).     Beckie Hyman MD, FACOG, FACS  Female Pelvic Medicine and  Reconstructive Surgery (Urogynecology)

## 2024-09-24 ENCOUNTER — OFFICE VISIT (OUTPATIENT)
Dept: UROLOGY | Facility: HOSPITAL | Age: 79
End: 2024-09-24
Attending: OBSTETRICS & GYNECOLOGY
Payer: MEDICARE

## 2024-09-24 VITALS — BODY MASS INDEX: 30.18 KG/M2 | WEIGHT: 164 LBS | RESPIRATION RATE: 18 BRPM | HEIGHT: 62 IN

## 2024-09-24 DIAGNOSIS — N39.41 URGE INCONTINENCE: Primary | ICD-10-CM

## 2024-09-24 DIAGNOSIS — R35.0 FREQUENCY OF MICTURITION: ICD-10-CM

## 2024-09-24 PROCEDURE — 51701 INSERT BLADDER CATHETER: CPT | Performed by: OBSTETRICS & GYNECOLOGY

## 2024-09-24 PROCEDURE — 99212 OFFICE O/P EST SF 10 MIN: CPT

## 2024-09-24 PROCEDURE — 87086 URINE CULTURE/COLONY COUNT: CPT | Performed by: OBSTETRICS & GYNECOLOGY

## 2024-09-24 PROCEDURE — 51701 INSERT BLADDER CATHETER: CPT

## 2024-09-24 RX ORDER — LISINOPRIL 10 MG/1
10 TABLET ORAL DAILY
COMMUNITY

## 2024-09-24 NOTE — PROGRESS NOTES
Patient straight catheterized during office visit by Dr. Hyman.  Unsure if enough urine obtained to process urine culture. Dr. Hyman aware.

## 2024-11-04 NOTE — PROGRESS NOTES
ID: Ailin Ding  : 1945  Date: 2024     No chief complaint on file.      HPI:  79 year old female, G4,  x 3,  who presents for evaluation of urinary incontinence for the past 3 years.  She has frequency, urgency and UUI  Denies nocturia  No SUKHWINDER  No prolapse  Status post hysterectomy and BSO in  for abnormal bleeding  Bowels are loose. Has dx of IBS-D  Not sexually active at this time due to 's health.  Denies UTIs. No recent urines on file.    PMHx: HTN, OA, BMI 30.       Urogynecology Summary:             HISTORY:  Past Medical History:    Esophageal reflux    High blood pressure    IBS (irritable bowel syndrome)    Migraines    none after menopause    Multiple thyroid nodules    Osteoarthritis      Past Surgical History:   Procedure Laterality Date    Breast biopsy      1987, 2000     N/A     3/1976, 10/1978, 1984    Cholecystectomy  1997    Hysterectomy  2017    Tonsillectomy      Total knee replacement  10/19/2016    rt total knee- dr. Knox    Total knee replacement Left 2018    DR. MCGRATH      Family History   Problem Relation Age of Onset    Heart Disorder Father     Hypertension Mother     Cancer Mother         Colon    Cancer Daughter         Brain    Cancer Sister         breast Ca    Cancer Brother         Brain      Social History     Socioeconomic History    Marital status:    Tobacco Use    Smoking status: Never    Smokeless tobacco: Never   Substance and Sexual Activity    Alcohol use: Yes     Alcohol/week: 14.0 standard drinks of alcohol     Types: 14 Glasses of wine per week    Drug use: No   Other Topics Concern    Pt has a pacemaker No    Pt has a defibrillator No    Reaction to local anesthetic No     Social Drivers of Health      Received from [facility]        Allergies:  Allergies   Allergen Reactions    Codeine NAUSEA AND VOMITING     Other reaction(s): Unknown    Demerol Hcl [Meperidine] NAUSEA AND  VOMITING     Other reaction(s): Unknown       Medications:  Outpatient Medications Prior to Visit   Medication Sig Dispense Refill    lisinopril 10 MG Oral Tab Take 1 tablet (10 mg total) by mouth daily.      losartan 100 MG Oral Tab Take 100 mg by mouth. (Patient not taking: Reported on 9/24/2024)      carvedilol 12.5 MG Oral Tab       clotrimazole-betamethasone 1-0.05 % External Cream Use bid to affected areas of skin (Patient not taking: Reported on 9/24/2024) 90 g 2    metRONIDAZOLE 0.75 % External Cream Use bid to affected areas of face (Patient not taking: Reported on 9/24/2024) 90 g 3    gabapentin 300 MG Oral Cap Take 1 capsule (300 mg total) by mouth 3 (three) times daily. (Patient not taking: Reported on 9/24/2024) 90 capsule 2    Warfarin Sodium 2 MG Oral Tab TAKE 3 TABLETS ON ODD NUMBERED DAYS  TAKE 2 TABLETS ON EVEN NUMBERED DAYS (Patient not taking: Reported on 9/24/2024) 60 tablet 0    Zolpidem Tartrate (AMBIEN) 5 MG Oral Tab Take 1 tablet (5 mg total) by mouth nightly as needed for Sleep. 10 tablet 0    multivitamin Oral Tab Take 1 tablet by mouth daily.      omeprazole 20 MG Oral Capsule Delayed Release Take 2 capsules (40 mg total) by mouth daily as needed.      hydrochlorothiazide 12.5 MG Oral Tab Take 1 tablet (12.5 mg total) by mouth daily.       No facility-administered medications prior to visit.       Review of Systems:    A comprehensive 12 point review of systems was completed.  Pertinent positives noted in the the HPI.  Denies CP  Denies SOB    Vitals:  There were no vitals taken for this visit.       GENERAL EXAM:  GENERAL:  Alert and oriented. Well-nourished, normally developed.  Thought and emotional status are appropriate, speech is understandable.  No acute distress.   HEAD: Normocephalic and atraumatic with normal hair distribution  LUNGS:  Normal respiratory effort.    ABDOMEN: Non tender to palpation, tone normal without rigidity or guarding, no masses present, no evidence of  hernia. Vertical midline scar noted.   EXTREMITIES:  Without edema, varicosities or lesions.   SKIN:  Warm and dry, with good color and turgor. No lesions.    PELVIC EXAM:  External Genitalia: Normal appearance for age. + atrophy, no lesions  Urethra: + atrophy, non tender  Bladder:no fullness, non tender  Vagina: + atrophy, no lesions, narrow introitus   Cervix and uterus: absent  Adnexa: N/A  Perineum: non tender  Anus: no hemorrhoids  Rectum: deferred.     PELVIS FLOOR NEUROMUSCULAR FUNCTION:  Strength:  1  Perineal Sensation:  Normal      PELVIC SUPPORT:  [place]:  0  Ant:  0  Post:  0  CST:  negative  UVJ: not hypermobile    The patient voided 10 ml in the privacy of the bathroom. She was catheterized for PVR of < 5 ml. Specimen sent for C&S.     Impression/Plan:  No diagnosis found.      Discussion Items:   Behavioral and pharmacologic treatments for OAB  Discussed dietary and behavioral modification, discussed pharmacologic and nonpharmacologic mgmt options for urinary symptoms. Discussed dietary & weight management with potential improvements in symptoms with weight loss.    Diagnostic Items:  Urine C&S    Medications Discussed:  Agrees to trial of Gemtesa 75 mg PO daily    Treatment Plan, Non-surgical:   None    Treatment Plan, Surgical:   None    Pt verbalizes understanding of all above discussed information. Follow up in 6 weeks with PA (phone visit OK).     Beckie Hyman MD, FACOG, FACS  Female Pelvic Medicine and  Reconstructive Surgery (Urogynecology)     New onset atrial fibrillation in the evening on 12/20 post cath.  Per patient no Hx of Afib.    - HR remains 70-90s.  Patient Asx.   - Initiated Eliquis 5mg PO BID  - Continue Toprol XL 25mg daily, up titrate as tolerated  - No plan for EP consult or DCCV at this time per Dr. Lema.  Monitor overnight for rate control. New onset atrial fibrillation in the evening on 12/20 post cath.  Per patient no Hx of Afib.    - HR remains 70-90s.  Patient Asx.   - Initiated Eliquis 5mg PO BID  - Continue Toprol XL 25mg daily, up titrate as tolerated  - No plan for EP consult or DCCV at this time per Dr. Lema.  Monitor overnight for rate control.

## 2024-11-05 ENCOUNTER — VIRTUAL PHONE E/M (OUTPATIENT)
Dept: UROLOGY | Facility: HOSPITAL | Age: 79
End: 2024-11-05
Attending: OBSTETRICS & GYNECOLOGY
Payer: MEDICARE

## 2024-11-05 DIAGNOSIS — N81.84 PELVIC MUSCLE WASTING: ICD-10-CM

## 2024-11-05 DIAGNOSIS — N39.41 URGE INCONTINENCE: Primary | ICD-10-CM

## 2024-11-05 RX ORDER — VIBEGRON 75 MG/1
1 TABLET, FILM COATED ORAL DAILY
Qty: 90 TABLET | Refills: 3 | Status: SHIPPED | OUTPATIENT
Start: 2024-11-05

## 2024-11-05 NOTE — PROGRESS NOTES
This visit is being conducted as a televisit with patient's consent.  Patient is in a safe, private environment for televisit, provider located in the office setting.    Visit for f/u of UUI    She is currently taking Gemtesa 75 mg  Denies SEs  Reports +improvement, happy  Voiding q 2-3+ hrs during day  Denies nocturia  Wearing light pad    Bowels reg, sometimes loose     Denies any current signs or symptoms of UTI      Impression/Plan:    ICD-10-CM    1. Urge incontinence  N39.41 Vibegron (GEMTESA) 75 MG Oral Tab      2. Pelvic muscle wasting  N81.84           Treatment Plan:  Continue Gemtesa 75 mg  -- if issues with cost/coverage rec trospium ER 60 mg daily on empty stomach (avoid Myrbetriq d/t HTN & pt on multiple anti-HTN meds)  Bowel regimen  Bladder diet/drill  Pelvic floor exercises  Call with s/sx of UTI    All questions answered  She understands and agrees to plan    Return in about 6 months (around 5/5/2025) for UUI (phone).    Michelle Gillespie PA-C    Note to patient: The 21st Century Cures Act makes medical notes like these available to patients in the interest of transparency.  However, be advised this is a medical document.  It is intended as peer to peer communication.  It is written in medical language and may contain abbreviations or verbiage that are unfamiliar.  It may appear blunt or direct.  Medical documents are intended to carry relevant information, facts as evident, and the clinical opinion of the practitioner.

## 2024-11-06 ENCOUNTER — TELEPHONE (OUTPATIENT)
Dept: UROLOGY | Facility: HOSPITAL | Age: 79
End: 2024-11-06

## 2024-11-06 NOTE — TELEPHONE ENCOUNTER
Fax received from [employer] regarding request for more info for PA for Gemtesa.  Forms completed, signed, and faxed back to [employer] with confirmation of receipt.

## 2024-11-07 NOTE — TELEPHONE ENCOUNTER
TC to pt regarding Gemtesa PA.  No answer.  Left detailed message informing pt PA has been approved.  Encouraged to call with any further issues with cost/coverage, otherwise to f/u as scheduled.

## 2024-11-27 NOTE — PROGRESS NOTES
[facility]  Hospitalist Progress  Note     Mishel Schafersalomón Patient Status:  Inpatient    1945  71year old CSN [id number]   Location 407/407-A Attending Noe Pfeiffer MD   Hosp Day # 2 PCP KELIN Taylor     ASSESSMENT/PLAN multivitamin  1 tablet Oral Daily   • carvedilol  12.5 mg Oral BID with meals   • hydrochlorothiazide  12.5 mg Oral Daily   • omeprazole  20 mg Oral Daily   • losartan  100 mg Oral Nightly   • enoxaparin  30 mg Subcutaneous BID   • docusate sodium  100 mg 95.2

## 2025-01-28 ENCOUNTER — TELEPHONE (OUTPATIENT)
Dept: UROLOGY | Facility: HOSPITAL | Age: 80
End: 2025-01-28

## 2025-01-28 NOTE — TELEPHONE ENCOUNTER
Incoming TC from patient regarding side effects of Gemtesa. Patient experiencing hair loss. VM left on nursing line.    Outgoing TC to patient returning previous VM. Discussed concerns with JUDITH De La Rosa. All questions answered. Patient verbalizes understanding. Encouraged to call office with questions or concerns.

## 2025-05-06 ENCOUNTER — VIRTUAL PHONE E/M (OUTPATIENT)
Dept: UROLOGY | Facility: HOSPITAL | Age: 80
End: 2025-05-06
Attending: PHYSICIAN ASSISTANT
Payer: MEDICARE

## 2025-05-06 DIAGNOSIS — K59.00 CONSTIPATION, UNSPECIFIED CONSTIPATION TYPE: ICD-10-CM

## 2025-05-06 DIAGNOSIS — N39.41 URGE INCONTINENCE: Primary | ICD-10-CM

## 2025-05-06 DIAGNOSIS — N81.84 PELVIC MUSCLE WASTING: ICD-10-CM

## 2025-05-06 NOTE — PROGRESS NOTES
This visit is being conducted as a phone (audio only) visit with patient's consent.  Patient declined video visit due to technical capacity.  Patient is in a safe, private environment for televisit, provider located in the office setting.    Visit for f/u of UUI    She is currently taking Gemtesa 75 mg  Has IBS-D but reports slowing of bowels since starting med  Reports +improvement in UUI sx  Denies nocturia    Bowels constipated  Taking probiotic    Not doing home PF exercises     Denies any current signs or symptoms of UTI    Impression/Plan:    ICD-10-CM    1. Urge incontinence  N39.41       2. Pelvic muscle wasting  N81.84       3. Constipation, unspecified constipation type  K59.00           Discussion Items:   Bowel management reviewed. Discussed using fiber daily w/ addition of miralax as needed.    Discussed dietary and behavioral modifications for mgmt of urinary symptoms.  Discussed weight management and benefits of weight loss on urinary symptoms.  Reviewed AUA/SUFU guidelines on mgmt of non-neurogenic OAB.  Discussed pharmacologic and nonpharmacologic mgmt options of urinary symptoms - reviewed risks, benefits, alternatives, and goals of treatment.  Discussed specific risks related to OAB meds including, but not limited to dry mouth, constipation, blurry vision, cognitive changes, and BP elevation.    Treatment Plan:  Continue Gemtesa 75 mg  Bowel regimen  Bladder diet/drill  Pelvic floor exercises  Call with s/sx of UTI    All questions answered  She understands and agrees to plan    Return in about 6 months (around 11/6/2025) for UUI, yearly exam.    Michelle Gillespie PA-C    A total of 10 minutes were spent in discussion with the patient and/or family, chart review and documentation of this encounter.    Note to patient: The 21st Century Cures Act makes medical notes like these available to patients in the interest of transparency.  However, be advised this is a medical document.  It is intended as peer  to peer communication.  It is written in medical language and may contain abbreviations or verbiage that are unfamiliar.  It may appear blunt or direct.  Medical documents are intended to carry relevant information, facts as evident, and the clinical opinion of the practitioner.

## (undated) DEVICE — SUTURE ETHIBOND 1 CT-1

## (undated) DEVICE — SOL  .9 3000ML

## (undated) DEVICE — SUTURE ETHILON 3-0 669H

## (undated) DEVICE — STERILE LATEX POWDER-FREE SURGICAL GLOVESWITH NITRILE COATING: Brand: PROTEXIS

## (undated) DEVICE — BLADE SCPL 10 SHAW KNF PRCS

## (undated) DEVICE — SOLUTION SURG DURA PREP HAZMAT

## (undated) DEVICE — COVER SGL STRL LGHT HNDL BLU

## (undated) DEVICE — SURETRANS AUTOTRANSFUSION SYSTEM FOR ORTHOPAEDICS WITH PVC DRAIN AND 2 TROCARS: Brand: SURETRANS AUTOTRANSFUSION SYSTEM FOR ORTHOPAEDICS

## (undated) DEVICE — FAN SPRAY KIT: Brand: PULSAVAC®

## (undated) DEVICE — TOTAL KNEE: Brand: MEDLINE INDUSTRIES, INC.

## (undated) DEVICE — Device: Brand: POWER-FLO®

## (undated) DEVICE — ZIMMER® STERILE DISPOSABLE TOURNIQUET CUFF WITH PLC, DUAL PORT, SINGLE BLADDER, 34 IN. (86 CM)

## (undated) DEVICE — SUTURE SILK 0 FSL

## (undated) DEVICE — COTTON ROLL: Brand: DEROYAL

## (undated) DEVICE — GAUZE SPONGES,12 PLY: Brand: CURITY

## (undated) DEVICE — Device: Brand: STABLECUT®

## (undated) DEVICE — SOL  .9 1000ML BTL

## (undated) DEVICE — STERILE TETRA-FLEX CF, ELASTIC BANDAGE LATEX FREE 6IN X5.5 YD: Brand: TETRA-FLEX™CF

## (undated) DEVICE — PETROLATUM GAUZE CISION DRESSING: Brand: VASELINE

## (undated) DEVICE — T5 HOOD WITH PEEL AWAY FACE SHIELD

## (undated) DEVICE — BREAKAWAY NOZZLE: Brand: REVOLUTION

## (undated) DEVICE — TRAY SRGPRP PVP IOD WT SCRB SM

## (undated) DEVICE — SUTURE VICRYL 0 J340H

## (undated) DEVICE — BLADE SAW SAGITTAL 19.5

## (undated) DEVICE — BATTERY

## (undated) DEVICE — DUAL CUT SAGITTAL BLADE

## (undated) DEVICE — BANDAGE FLXMSTR 11YDX6IN STRL

## (undated) DEVICE — VIOLET BRAIDED (POLYGLACTIN 910), SYNTHETIC ABSORBABLE SUTURE: Brand: COATED VICRYL

## (undated) NOTE — MR AVS SNAPSHOT
[facility] [place] [facility] [place] [address] [address] [id number] [phone]               Thank you for choosing us for your health care visit with Betty Koch MD.  We are glad to serve you and happy to provide you with this summary of - clotrimazole-betamethasone 1-0.05 % Crea            MyChart     Sign up for [employer]Med[employer], your secure online medical record. [employer] will allow you to access patient instructions from your recent visit,  view other health information, and more.  To sign up o